# Patient Record
(demographics unavailable — no encounter records)

---

## 2025-01-08 NOTE — PROCEDURE
[Colposcopy] : Colposcopy  [Time out performed] : Pre-procedure time out performed.  Patient's name, date of birth and procedure confirmed. [Consent Obtained] : Consent obtained [Risks] : risks [Benefits] : benefits [Alternatives] : alternatives [HPV High Risk] : HPV high risk [No Premedication] : no premedication [Colposcopy Adequate] : colposcopy adequate [ECC Performed] : ECC performed [No Abnormalities] : no abnormalities [Hemostasis Obtained] : Hemostasis obtained [Tolerated Well] : the patient tolerated the procedure well [Biopsy] : biopsy not taken [de-identified] : Persistent +HPV 16 with normal pap smear screening [de-identified] : 2 [de-identified] : Small white punctate lesion at 12 o clock position.  Small flesh colored lesion noted at 9 o clock position.  No acetowhite changes. No increase in vascularity   [de-identified] : 9 o clock, 12 o clock  [de-identified] : Monsel solution placed

## 2025-01-08 NOTE — PLAN
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 On chart review, first noted in 2014 and noted to be persistent since 2022. Underwent colposcopy/ biopsy in 10/2022 with benign findings.  Pap smear screening with persistently NLIM results. Pt denies history of tobacoo use, is negative for HIV, and is s/p Gardisil vaccination.   Biopsy taken today in 12 and 9 o clock position with ECC. Will follow up results.  Discussed today with patient about recommendation for excisional procedure (LEEP verse CKC) given persisten HR HPV findings. All questions answered.   Pt to return to gyn clinic in 4w to further discuss pathology from today and surgical planning.  Procedure preformed with Dr. Peter Rao PGY 4

## 2025-02-07 NOTE — PHYSICAL EXAM
[No Acute Distress] : no acute distress [Well Nourished] : well nourished [Well Developed] : well developed [Well-Appearing] : well-appearing [Normal Sclera/Conjunctiva] : normal sclera/conjunctiva [PERRL] : pupils equal round and reactive to light [EOMI] : extraocular movements intact [Normal Outer Ear/Nose] : the outer ears and nose were normal in appearance [Normal Oropharynx] : the oropharynx was normal [Normal TMs] : both tympanic membranes were normal [No JVD] : no jugular venous distention [No Lymphadenopathy] : no lymphadenopathy [Thyroid Normal, No Nodules] : the thyroid was normal and there were no nodules present [No Respiratory Distress] : no respiratory distress  [No Accessory Muscle Use] : no accessory muscle use [Clear to Auscultation] : lungs were clear to auscultation bilaterally [Normal Rate] : normal rate  [Regular Rhythm] : with a regular rhythm [Normal S1, S2] : normal S1 and S2 [No Murmur] : no murmur heard [No Edema] : there was no peripheral edema [No Extremity Clubbing/Cyanosis] : no extremity clubbing/cyanosis [Soft] : abdomen soft [Non Tender] : non-tender [Non-distended] : non-distended [Normal Bowel Sounds] : normal bowel sounds [Normal Supraclavicular Nodes] : no supraclavicular lymphadenopathy [Normal Posterior Cervical Nodes] : no posterior cervical lymphadenopathy [Normal Anterior Cervical Nodes] : no anterior cervical lymphadenopathy [No Joint Swelling] : no joint swelling [Grossly Normal Strength/Tone] : grossly normal strength/tone [No Rash] : no rash [No Skin Lesions] : no skin lesions [Coordination Grossly Intact] : coordination grossly intact [No Focal Deficits] : no focal deficits [Normal Gait] : normal gait [Speech Grossly Normal] : speech grossly normal [Memory Grossly Normal] : memory grossly normal [Normal Affect] : the affect was normal [Normal Mood] : the mood was normal [Normal Insight/Judgement] : insight and judgment were intact

## 2025-02-08 NOTE — HISTORY OF PRESENT ILLNESS
[FreeTextEntry1] : CPE [de-identified] : Ellyn Bañuelos is a 56 year old female with a history of primary hyperparathyroidism, T2DM, HTN, HLD, and high risk HPV.  She has no medical concerns at this time.   She is currently out of her amlodipine and lisinopril, however prior to running out last week she would take them daily. She has continued to take her metformin 1000 AM and 500 PM. She has not taken atorvastatin or vitamin D supplements since 2023 when she says they were discontinued.   She saw her gynecologist 10/3/24 for her annual check-up, and underwent colposcopy with biopsy in January which time there were no findings concerning for malignancy. She has not seen an endocrinologist since December '23.   She does not use alcohol, tobacco, or any other drugs. She currently lives with her , son, and their 1 cat. She has a functional fire and CO alarm in the home. She uses a seatbelt when driving. There are no guns in the house. She has had no changes in her vision, hearing, or overall functional status.  Health Maintenance: DEXA: 1/31/23 Mammogram: 3/28/24 Pap: 10/3/24 FOBT: 7/10/23 TDAP: 4/24/23 Prvenar 20: 10/31/22  Flu shot: 9/24 Dentist: 11/24

## 2025-02-08 NOTE — INTERPRETER SERVICES
[Language Line ] : provided by Language Line   [Time Spent: ____ minutes] : Total time spent using  services: [unfilled] minutes. The patient's primary language is not English thus required  services. [Interpreters_IDNumber] : [id number] [Interpreters_FullName] : Juan Carlos Smith [TWNoteComboBox1] : Greenlandic

## 2025-02-08 NOTE — HISTORY OF PRESENT ILLNESS
[FreeTextEntry1] : CPE [de-identified] : Ellyn Bañuelos is a 56 year old female with a history of primary hyperparathyroidism, T2DM, HTN, HLD, and high risk HPV.  She has no medical concerns at this time.   She is currently out of her amlodipine and lisinopril, however prior to running out last week she would take them daily. She has continued to take her metformin 1000 AM and 500 PM. She has not taken atorvastatin or vitamin D supplements since 2023 when she says they were discontinued.   She saw her gynecologist 10/3/24 for her annual check-up, and underwent colposcopy with biopsy in January which time there were no findings concerning for malignancy. She has not seen an endocrinologist since December '23.   She does not use alcohol, tobacco, or any other drugs. She currently lives with her , son, and their 1 cat. She has a functional fire and CO alarm in the home. She uses a seatbelt when driving. There are no guns in the house. She has had no changes in her vision, hearing, or overall functional status.  Health Maintenance: DEXA: 1/31/23 Mammogram: 3/28/24 Pap: 10/3/24 FOBT: 7/10/23 TDAP: 4/24/23 Prvenar 20: 10/31/22  Flu shot: 9/24 Dentist: 11/24

## 2025-02-08 NOTE — PLAN
[FreeTextEntry1] : #Health Maintenance - F/u labs (A1c, lipids, CMP) - DEXA ordered - FIT ordered - SDOH (-)  #HTN - BP elevated today, however off medication - Lisinopril and Amlodipine renewed - RTC in 5 weeks to re-check and adjust meds as appropriate  #Hyperparathyroidism - F/u labs (iCal, Phos, VitD, PTH) - Endocrinology referral given to re-establish care

## 2025-02-08 NOTE — INTERPRETER SERVICES
[Language Line ] : provided by Language Line   [Time Spent: ____ minutes] : Total time spent using  services: [unfilled] minutes. The patient's primary language is not English thus required  services. [Interpreters_IDNumber] : [id number] [Interpreters_FullName] : Juan Carlos Smith [TWNoteComboBox1] : Malawian

## 2025-02-19 NOTE — ASSESSMENT
[FreeTextEntry1] : 57 y/o P1 presenting for LEEP due to persistent HPV on pap smear s/p negative colpo on 1/8.  #LEEP - Cervix injected with 8 mL Lidocaine 1% with epi - LEEP performed without issue - Good hemostasis with electrocautery and Monsel's - Patient given pelvic rest instructions for 6 weeks - RTC in 2 wks for post-procedure check  Patient seen and d/w attg Dr Peter Dill PGY4  [place]  [place] #[phone]

## 2025-02-19 NOTE — PROCEDURE
[Colposcopy with Loop Electrode Excision of the Cervix] : Colposcopy with loop electrode excision of the cervix [Time out performed] : Pre-procedure time out performed.  Patient's name, date of birth and procedure confirmed. [Consent Obtained] : Consent obtained [Risks] : risks [Benefits] : benefits [Alternatives] : alternatives [Patient] : patient [Infection] : infection [Bleeding] : bleeding [Negative] : negative pregnancy test [____mg Lidocaine w/Epi] : ~Vmg  lidocaine with epinephrine [Cutting Setting ___watts] : cutting setting [unfilled] noguera [Coag Setting ___watts] : coag setting [unfilled] noguera [ECC Done] : ECC done [Hemostasis Obtained] : Hemostasis obtained [Sent to Pathology] : the specimen was placed in buffered formalin and sent for pathology [Tolerated Well] : the patient tolerated the procedure well [de-identified] : persistent HPV [de-identified] : Francis's solution

## 2025-02-19 NOTE — ASSESSMENT
[FreeTextEntry1] : 55 y/o P1 presenting for LEEP due to persistent HPV on pap smear s/p negative colpo on 1/8.  #LEEP - Cervix injected with 8 mL Lidocaine 1% with epi - LEEP performed without issue - Good hemostasis with electrocautery and Monsel's - Patient given pelvic rest instructions for 6 weeks - RTC in 2 wks for post-procedure check  Patient seen and d/w attg Dr Peter Dill PGY4  [place]  [place] #[phone]

## 2025-02-19 NOTE — PROCEDURE
[Colposcopy with Loop Electrode Excision of the Cervix] : Colposcopy with loop electrode excision of the cervix [Time out performed] : Pre-procedure time out performed.  Patient's name, date of birth and procedure confirmed. [Consent Obtained] : Consent obtained [Risks] : risks [Benefits] : benefits [Alternatives] : alternatives [Patient] : patient [Infection] : infection [Bleeding] : bleeding [Negative] : negative pregnancy test [____mg Lidocaine w/Epi] : ~Vmg  lidocaine with epinephrine [Cutting Setting ___watts] : cutting setting [unfilled] noguera [Coag Setting ___watts] : coag setting [unfilled] noguera [ECC Done] : ECC done [Hemostasis Obtained] : Hemostasis obtained [Sent to Pathology] : the specimen was placed in buffered formalin and sent for pathology [Tolerated Well] : the patient tolerated the procedure well [de-identified] : persistent HPV [de-identified] : Francis's solution

## 2025-03-13 NOTE — HISTORY OF PRESENT ILLNESS
[FreeTextEntry1] : 55 P1 yo presenting for follow s/p LEEP (2/19/25) due to persistent HPV infection. Patient states she is doing well. Denies heavy vaginal bleeding, nausea, vomiting. Denies vaginal pain. Patient tolerating regular diet. Voiding and having bowel movements without difficulty.   Colpo (10/2022): benign  Pap (11/2024): NILM HPV 16+ Colpo (1/2025): 9 o'clock, 12 o'clock - inflammatory changes LEEP (2/27/25): benign   s/p Gardasil

## 2025-03-13 NOTE — PHYSICAL EXAM
[Appropriately responsive] : appropriately responsive [Alert] : alert [Soft] : soft [Non-tender] : non-tender [Oriented x3] : oriented x3 [Labia Majora] : normal [Labia Minora] : normal [Normal] : normal [FreeTextEntry5] : well healed

## 2025-03-13 NOTE — PLAN
[FreeTextEntry1] : 55 P1 yo presenting for follow s/p LEEP (2/19/25) due to persistent HPV infection.  #s/p LEEP - Healing appropriately  - Pathology reviewed with patient  - plan for repeat pap smear and co testing in 6 months from last pap -i.e. 5/2025 - Questions answered   Brooklyn Lima, PGY4 d/w Dr. Orourke

## 2025-03-13 NOTE — REASON FOR VISIT
[Follow-Up] : a follow-up evaluation of [Language Line ] : provided by Language Line   [Time Spent: ____ minutes] : Total time spent using  services: [unfilled] minutes. The patient's primary language is not English thus required  services. [Interpreters_IDNumber] : [id number]

## 2025-03-13 NOTE — REASON FOR VISIT
[Follow-Up] : a follow-up evaluation of [Language Line ] : provided by Language Line   [Time Spent: ____ minutes] : Total time spent using  services: [unfilled] minutes. The patient's primary language is not English thus required  services. [Interpreters_IDNumber] : [id number]

## 2025-03-14 NOTE — INTERPRETER SERVICES
[Language Line ] : provided by Language Line   [Time Spent: ____ minutes] : Total time spent using  services: [unfilled] minutes. The patient's primary language is not English thus required  services. [Interpreters_IDNumber] : [id number] [Interpreters_FullName] : Mat [TWNoteComboBox1] : Gambian

## 2025-03-14 NOTE — PLAN
[FreeTextEntry1] : #Hypertension - BP today 120/70 - Good compliance with meds now - Continue current meds: amlodipine 5mg, Lisinopril 10mg  #Hyperparathyroidism - Re-sent Vitamin D - Instructed that if pharmacy does not have rx or if expensive to look over the counter for Vitamin D 25mcg (1000IU) to be taken daily - Provided endocrinology info

## 2025-03-14 NOTE — INTERPRETER SERVICES
[Language Line ] : provided by Language Line   [Time Spent: ____ minutes] : Total time spent using  services: [unfilled] minutes. The patient's primary language is not English thus required  services. [Interpreters_IDNumber] : [id number] [Interpreters_FullName] : Mat [TWNoteComboBox1] : Cambodian

## 2025-03-14 NOTE — HISTORY OF PRESENT ILLNESS
[de-identified] : Ellyn Bañuelos is a 56 year old female with a history of primary hyperparathyroidism, T2DM, HTN, HLD, and high risk HPV, here today for a blood pressure check and hyperparathyroidism follow up.   Since her last visit she has been taking her amlodipine and lisinopril, as well as the atorvastatin, however she never received the Vitamin D. She also could not find the information to re-establish care with her endocrinologist. She otherwise feels well.   Health Maintenance: DEXA: 1/31/23 Mammogram: 3/28/24 Pap: 10/3/24 FOBT: 7/10/23 TDAP: 4/24/23 Prvenar 20: 10/31/22  Flu shot: 9/24 Dentist: 11/24

## 2025-03-14 NOTE — END OF VISIT
[] : Resident [FreeTextEntry3] : bp at goal, pt to see endo.  [Time Spent: ___ minutes] : I have spent [unfilled] minutes of time on the encounter which excludes teaching and separately reported services.

## 2025-03-14 NOTE — HISTORY OF PRESENT ILLNESS
[de-identified] : Ellyn Bañuelos is a 56 year old female with a history of primary hyperparathyroidism, T2DM, HTN, HLD, and high risk HPV, here today for a blood pressure check and hyperparathyroidism follow up.   Since her last visit she has been taking her amlodipine and lisinopril, as well as the atorvastatin, however she never received the Vitamin D. She also could not find the information to re-establish care with her endocrinologist. She otherwise feels well.   Health Maintenance: DEXA: 1/31/23 Mammogram: 3/28/24 Pap: 10/3/24 FOBT: 7/10/23 TDAP: 4/24/23 Prvenar 20: 10/31/22  Flu shot: 9/24 Dentist: 11/24

## 2025-05-05 NOTE — HISTORY OF PRESENT ILLNESS
[FreeTextEntry1] : 55 yo P1 LMP 10 months ago here for follow up pap s/p LEEP (2/19/25) due to persistent HPV infection. Patient states she is doing well. Denies fever, chills, n/v, abdominal pain, vaginal bleeding.   Colpo (10/2022): benign Pap (11/2024): NILM HPV 16+ Colpo (1/2025): 9 o'clock, 12 o'clock - inflammatory changes LEEP (2/27/25): benign s/p Gardasil

## 2025-05-05 NOTE — PHYSICAL EXAM
[Chaperoned Physical Exam] : A chaperone was present in the examining room during all aspects of the physical examination. [MA] : MA [Appropriately responsive] : appropriately responsive [Alert] : alert [No Acute Distress] : no acute distress [Soft] : soft [Non-tender] : non-tender [Oriented x3] : oriented x3 [Normal] : normal external genitalia [FreeTextEntry3] : Grossly normal [FreeTextEntry5] : Breathing comfortably on room air  [FreeTextEntry4] : Speculum exam revealed normal vaginal canal with well healed cervix

## 2025-05-05 NOTE — REVIEW OF SYSTEMS
[Fever] : no fever [Chills] : no chills [Nasal Discharge] : no nasal discharge [Dyspnea] : no dyspnea [Cough] : no cough [Chest Pain] : no chest pain [Abdominal Pain] : no abdominal pain [Headache] : no headache

## 2025-05-05 NOTE — PLAN
[FreeTextEntry1] : 55 yo P1 here for follow up pap s/p LEEP (2/19/25) due to persistent HPV infection. Pt endorses feeling well at this time.   #HPV infection - s/p LEEP  - Pap performed today - RTC in 6 months for pap/ HPV testing  Dw Dr. Bairon Navarrete PGY1

## 2025-05-05 NOTE — PLAN
[FreeTextEntry1] : 57 yo P1 here for follow up pap s/p LEEP (2/19/25) due to persistent HPV infection. Pt endorses feeling well at this time.   #HPV infection - s/p LEEP  - Pap performed today - RTC in 6 months for pap/ HPV testing  Dw Dr. Bairon Navarrete PGY1

## 2025-05-23 NOTE — HISTORY OF PRESENT ILLNESS
[FreeTextEntry1] : BP Check [de-identified] : Ellyn Bañuelos is a 56 year old female with a history of primary hyperparathyroidism, T2DM, HTN, HLD, and high risk HPV, here today for a blood pressure check and hyperparathyroidism follow up.  She continues to take her amlodipine, lisinopril and atorvastatin with good compliance. She also started the Vitamin D supplement. She has not scheduled an endocrinology appointment yet however she is planning to do so this week.

## 2025-05-23 NOTE — INTERPRETER SERVICES
[Language Line ] : provided by Language Line   [Time Spent: ____ minutes] : Total time spent using  services: [unfilled] minutes. The patient's primary language is not English thus required  services. [Interpreters_IDNumber] : [id number] [Interpreters_FullName] : Meena Gonzales [TWNoteComboBox1] : [place]

## 2025-05-23 NOTE — PLAN
[FreeTextEntry1] : #Hypertension - 120/80 today in clinic - Continue Amlodipine 5mg qd - Continue Lisinopril 10mg qd  #Hyperparathyroidism - Continue Vitamin D supplement - Endocrinology referral renewed  #HLD - Lipids 5/7/25 reviewed - Tcholesterol 216 (2/5/25) ->135 -  (2/5/25) ->73 - Continue Atorvastatin 20mg qd  #Health Maintenance - Colonoscopy referral placed - Follow up in 6 months for diabetes and BP check

## 2025-07-24 NOTE — PHYSICAL EXAM
[Alert] : alert [Well Nourished] : well nourished [Healthy Appearance] : healthy appearance [No Acute Distress] : no acute distress [Normal Voice/Communication] : normal voice communication [Normal Sclera/Conjunctiva] : normal sclera/conjunctiva [No Proptosis] : no proptosis [No Neck Mass] : no neck mass was observed [No LAD] : no lymphadenopathy [Thyroid Not Enlarged] : the thyroid was not enlarged [No Thyroid Nodules] : no palpable thyroid nodules [No Respiratory Distress] : no respiratory distress [No Accessory Muscle Use] : no accessory muscle use [Normal Rate and Effort] : normal respiratory rate and effort [Normal Rate] : heart rate was normal [Regular Rhythm] : with a regular rhythm [No CVA Tenderness] : no ~M costovertebral angle tenderness [No Spinal Tenderness] : no spinal tenderness [Spine Straight] : spine straight [No Stigmata of Cushings Syndrome] : no stigmata of Cushings Syndrome [Normal Gait] : normal gait [No Rash] : no rash [No Skin Lesions] : no skin lesions [Oriented x3] : oriented to person, place, and time [Normal Affect] : the affect was normal [Normal Insight/Judgement] : insight and judgment were intact [Normal Mood] : the mood was normal [Kyphosis] : no kyphosis present [Scoliosis] : no scoliosis [de-identified] : no evidence of psoriasis on exam of dorsum  hands (reported prior location) or upper extremities

## 2025-07-24 NOTE — ASSESSMENT
[FreeTextEntry1] : This is a 55 yo F with  a PMH of HTN, DM2 ,  primary hyperparathyroidism  and osteopeniawho presents for follow up.  #Primary hyperparathyroidism -creatinine 0.58 (02/2025) 02/2025: Calcium 10.1 /w albumin 4.1 , PTH 41, 25-OH vitamin D 23.7 PTH 72 (5/2019) --> 45 (4/2022) 25-OH vitamin D - 27.6 (6/2019) --> 25.9 (10/22) 1,25-OH vitamin D  - 40.4 24 hour urine calcium - 120 /w 3L of urine. 24 hr urine Creatinine 0.8 DEXA done in 2019 showed osteopenia in the spine (-2), femoral neck (-1.3), 1/3 radius (-0.9) DEXA 2025 showed osteopenia in the spine (-1.6), femoral neck (-0.7) , 1/3 radius **** Renal US 2019 - no evidence of nephrolithiasis -encouraged patient to increase oral hydration to 2L of water per day -not taking any oral calcium supplements though eats yogurts -Renal US negative in past and urine calcium of 120, so given calcium is borderline and without any signs of nephrolithiasis at this time, no further imaging or testing of urine calcium at this time. Can reassess with next labs depending on results of serum calcium -DEXA with osteopenia - see below   #osteopenia -repeatt DEXA 3/2027 -c/w 1000 IU vitamin D3 daily DEXA RESULTS 1/2023: Spine: -1.8, osteopenia; previously -2.0. Femoral neck: -0.1 , normal; previously -1.3. Total hip: 0.2 , normal; previously 0.1. Radius 1/3: -0.6 ,normal; previously -0.9.  #DM2- A1c 6.3% -c/w metformin 500mg BID -urine microalbumin negative 2/2023, repeat with PCP at next visit in February -has ophthalmology visit next month, no known history of retinopathy -denies neuropathy  #HLD -LDL 57 on atorvastatin 20mg daily, can continue  #HTN -BP controlled on amlodipine 5mg daily and lisinopril 10mg daily -BP at goal <130/80  Case discussed with Dr. Rhonda Mitchell DO Endocrinology Fellow PGY 4

## 2025-07-24 NOTE — HISTORY OF PRESENT ILLNESS
[FreeTextEntry1] : Patient has family member present as   This is a 55 yo F /w a PMH of HTN, DM2, and hypeparathyroidism who presents for follow up.  Pt is accompanied by her son and declined the use of .  No acute complaints today. Denies nausea, vomiting, abdominal pain, polyuria or polydipsia. Does not take calcium supplement No falls. Takes vitamin D 1000 IU supplement daily OTC, states that it is not covered by insurance. Requesting weekly Vit D injections.  DEXA 03/18/25 done shows some improvement in bone density RESULTS: Spine: -1.6, osteopenia; previously - 1.8, 2.0. Femoral neck:0.7  , normal; previously 0.1,  -1.3 Total hip: 0.3 , normal; previously 0.2,  0.1. Radius 1/3: ****- ,normal; previously 0.6,  -0.9.  Denies polyuria, dysuria or back pain  Hypercalcemia history and prior evaluation that showed: creatinine 0.58 (02/2025) 02/2025: Calcium 10.1 /w albumin 4.1 , PTH 41, 25-OH vitamin D 23.7 PTH 72 (5/2019) --> 45 (4/2022) 25-OH vitamin D - 27.6 (6/2019) --> 25.9 (10/22) 1,25-OH vitamin D  - 40.4 24 hour urine calcium - 120 /w 3L of urine. 24 hr urine Creatinine 0.8 DEXA done in 2019 showed osteopenia in the spine (-2), femoral neck (-1.3), 1/3 radius (-0.9) DEXA 2025 showed osteopenia in the spine (-1.6), femoral neck (-0.7) , 1/3 radius **** Renal US 2019 - no evidence of nephrolithiasis  Patient denies any urinary symptoms, back pain, nephrolithiasis, falls or fractures. She was referred back for calcium corrected to 10.2 .   Type 2 DM -A1c today 7/24/25 6.4 -Home regimen: Metformin 500mg BID -Checks BG in AM every 8 days: 130 to 160. No hypoglycemic episodes or symptoms. -Reports compliance  -No microvascular or macrovascular complications. -UTD with opthalmology -Pt has never seen podiatry. -Diet: Breakfast-coffee with cookies; Lunch-Tortilla with cheese with eggs-, Dinner: meat, chicken, rice, No snacks, drinks coffee with no sugar. -2/5/25: eGFR 106 -urine alb neg in 2023.

## 2025-07-24 NOTE — PHYSICAL EXAM
[Alert] : alert [Well Nourished] : well nourished [Healthy Appearance] : healthy appearance [No Acute Distress] : no acute distress [Normal Voice/Communication] : normal voice communication [Normal Sclera/Conjunctiva] : normal sclera/conjunctiva [No Proptosis] : no proptosis [No Neck Mass] : no neck mass was observed [No LAD] : no lymphadenopathy [Thyroid Not Enlarged] : the thyroid was not enlarged [No Thyroid Nodules] : no palpable thyroid nodules [No Respiratory Distress] : no respiratory distress [No Accessory Muscle Use] : no accessory muscle use [Normal Rate and Effort] : normal respiratory rate and effort [Normal Rate] : heart rate was normal [Regular Rhythm] : with a regular rhythm [No CVA Tenderness] : no ~M costovertebral angle tenderness [No Spinal Tenderness] : no spinal tenderness [Spine Straight] : spine straight [No Stigmata of Cushings Syndrome] : no stigmata of Cushings Syndrome [Normal Gait] : normal gait [No Rash] : no rash [No Skin Lesions] : no skin lesions [Oriented x3] : oriented to person, place, and time [Normal Affect] : the affect was normal [Normal Insight/Judgement] : insight and judgment were intact [Normal Mood] : the mood was normal [Kyphosis] : no kyphosis present [Scoliosis] : no scoliosis [de-identified] : no evidence of psoriasis on exam of dorsum  hands (reported prior location) or upper extremities

## 2025-07-24 NOTE — HISTORY OF PRESENT ILLNESS
[FreeTextEntry1] : Patient has family member present as   This is a 53 yo F /w a PMH of HTN, DM2, and hypeparathyroidism who presents for follow up.  Pt is accompanied by her son and declined the use of .  No acute complaints today. Denies nausea, vomiting, abdominal pain, polyuria or polydipsia. Does not take calcium supplement No falls. Takes vitamin D 1000 IU supplement daily OTC, states that it is not covered by insurance. Requesting weekly Vit D injections.  DEXA 03/18/25 done shows some improvement in bone density RESULTS: Spine: -1.6, osteopenia; previously - 1.8, 2.0. Femoral neck:0.7  , normal; previously 0.1,  -1.3 Total hip: 0.3 , normal; previously 0.2,  0.1. Radius 1/3: ****- ,normal; previously 0.6,  -0.9.  Denies polyuria, dysuria or back pain  Hypercalcemia history and prior evaluation that showed: creatinine 0.58 (02/2025) 02/2025: Calcium 10.1 /w albumin 4.1 , PTH 41, 25-OH vitamin D 23.7 PTH 72 (5/2019) --> 45 (4/2022) 25-OH vitamin D - 27.6 (6/2019) --> 25.9 (10/22) 1,25-OH vitamin D  - 40.4 24 hour urine calcium - 120 /w 3L of urine. 24 hr urine Creatinine 0.8 DEXA done in 2019 showed osteopenia in the spine (-2), femoral neck (-1.3), 1/3 radius (-0.9) DEXA 2025 showed osteopenia in the spine (-1.6), femoral neck (-0.7) , 1/3 radius **** Renal US 2019 - no evidence of nephrolithiasis  Patient denies any urinary symptoms, back pain, nephrolithiasis, falls or fractures. She was referred back for calcium corrected to 10.2 .   Type 2 DM -A1c today 7/24/25 6.4 -Home regimen: Metformin 500mg BID -Checks BG in AM every 8 days: 130 to 160. No hypoglycemic episodes or symptoms. -Reports compliance  -No microvascular or macrovascular complications. -UTD with opthalmology -Pt has never seen podiatry. -Diet: Breakfast-coffee with cookies; Lunch-Tortilla with cheese with eggs-, Dinner: meat, chicken, rice, No snacks, drinks coffee with no sugar. -2/5/25: eGFR 106 -urine alb neg in 2023.